# Patient Record
(demographics unavailable — no encounter records)

---

## 2025-04-22 NOTE — PHYSICAL EXAM
[General Appearance - Well Developed] : well developed [General Appearance - Well Nourished] : well nourished [Normal Appearance] : normal appearance [Well Groomed] : well groomed [General Appearance - In No Acute Distress] : no acute distress [] : no respiratory distress [Exaggerated Use Of Accessory Muscles For Inspiration] : no accessory muscle use [Costovertebral Angle Tenderness] : no ~M costovertebral angle tenderness [Normal Station and Gait] : the gait and station were normal for the patient's age [Skin Color & Pigmentation] : normal skin color and pigmentation [No Focal Deficits] : no focal deficits [Oriented To Time, Place, And Person] : oriented to person, place, and time [Affect] : the affect was normal [Mood] : the mood was normal [Not Anxious] : not anxious

## 2025-04-24 NOTE — LETTER BODY
[Dear  ___] : Dear  [unfilled], [Courtesy Letter:] : I had the pleasure of seeing your patient, [unfilled], in my office today. [Please see my note below.] : Please see my note below. [Consult Closing:] : Thank you very much for allowing me to participate in the care of this patient.  If you have any questions, please do not hesitate to contact me. [Sincerely,] : Sincerely, [FreeTextEntry3] : Fabricio Parikh MD System Director Urogynecology/URPS Department of Urology [facility]   at [facility]  of Urology [facility] at [facility]

## 2025-04-24 NOTE — LETTER BODY
[Dear  ___] : Dear  [unfilled], [Courtesy Letter:] : I had the pleasure of seeing your patient, [unfilled], in my office today. [Please see my note below.] : Please see my note below. [Consult Closing:] : Thank you very much for allowing me to participate in the care of this patient.  If you have any questions, please do not hesitate to contact me. [Sincerely,] : Sincerely, [FreeTextEntry3] : Fabricio Parikh MD System Director Urogynecology/URPS Department of Urology [facility]   at [facility]  of Urology [facility] at [facility] [place]

## 2025-04-24 NOTE — ASSESSMENT
[FreeTextEntry1] :   Impression/plan: 81-year-old female with gross hematuria.   1. Gross hematuria workup explained, reviewed possible sources of hematuria, questions answered.  2. Urine culture and cytology. Will f/u with result.  3. CT urogram 4. F/u for cystoscopy 5. Call office if symptoms worsen prior to next visit.   I, Dr. Fabricio Parikh, personally performed the evaluation and management (E/M) services for this new patient.  That E/M includes conducting the clinically appropriate initial history &/or exam, assessing all conditions, and establishing the plan of care.  Today, my JERILYN Tammy, was here to observe &/or participate in the visit & follow plan of care established by me.

## 2025-04-24 NOTE — HISTORY OF PRESENT ILLNESS
[FreeTextEntry1] : 81-year-old female here for evaluation of hematuria. She first noticed pink urine on 4/15/25. Hematuria increased on 4/17/25. She went to urgent care and was referred to the ED. She was evaluated at [facility]. Urinalysis showed 50 RBCs/hpf. Urine culture was not done. She did not have any imaging. Advised to follow up with Urology. Hematuria has since resolved but she reports pain to her left hip yesterday. Took 1 tab Advil for pain relief. She denies history of stones, chemical exposure or trauma. No family hx of  malignancies. She had 1 UTI several years ago. Former 4 cigarette per day smoker x 5 years, quit 10 years ago. Denies fever, chills, dysuria or frequency.  Urinalysis  RBCs>50/hpf  PMH-asthma(seasonal), high cholesterol, osteoporosis  PSH-denies FamHx-Breast CA(mother) SocHx-former smoker, denies drug or alcohol use Meds/Vitamins-Calcium, Vitamin D, Alendronate, atorvastatin, albuterol Allergies-NKDA

## 2025-04-24 NOTE — HISTORY OF PRESENT ILLNESS
[FreeTextEntry1] : 81-year-old female here for evaluation of hematuria. She first noticed pink urine on 4/15/25. Hematuria increased on 4/17/25. She went to urgent care and was referred to the ED. She was evaluated at [facility]. Urinalysis showed 50 RBCs/hpf. Urine culture was not done. She did not have any imaging. Advised to follow up with Urology. Hematuria has since resolved but she reports pain to her left hip yesterday. Took 1 tab Advil for pain relief. She denies history of stones, chemical exposure or trauma. No family hx of  malignancies. She had 1 UTI several years ago. Former 4 cigarette per day smoker x 5 years, quit 10 years ago. Denies fever, chills, dysuria or frequency.  Urinalysis  RBCs>50/hpf  PMH-asthma(seasonal), high cholesterol, osteoporosis  PSH-denies FamHx-Breast CA(mother) SocHx-former smoker, denies drug or alcohol use Meds/Vitamins-Calcium, Vitamin D, Alendronate, atorvastatin, albuterol Allergies-NKDA

## 2025-05-14 NOTE — HISTORY OF PRESENT ILLNESS
[FreeTextEntry1] : 81-year-old female here for evaluation of hematuria. She first noticed pink urine on 4/15/25. Hematuria increased on 4/17/25. She went to urgent care and was referred to the ED. She was evaluated at [facility]. Urinalysis showed 50 RBCs/hpf. Urine culture was not done. She did not have any imaging. Advised to follow up with Urology. Hematuria has since resolved but she reports pain to her left hip yesterday. Took 1 tab Advil for pain relief. She denies history of stones, chemical exposure or trauma. No family hx of  malignancies. She had 1 UTI several years ago. Former 4 cigarette per day smoker x 5 years, quit 10 years ago. Denies fever, chills, dysuria or frequency.  Urinalysis RBCs>50/hpf  PMH-asthma(seasonal), high cholesterol, osteoporosis PSH-denies FamHx-Breast CA(mother) SocHx-former smoker, denies drug or alcohol use Meds/Vitamins-Calcium, Vitamin D, Alendronate, atorvastatin, albuterol Allergies-NKDA  5/14/25  81-year-old female seen previously for evaluation of gross hematuria, found to have ureteral stones.  She had had a cystoscopy approximately 2 weeks ago and found a small stone that was removed from her bladder that appeared to have passed.  She is here today because over the last couple days she has had recurrence of some dysuria and some bladder discomfort.  No other episodes of hematuria.  She denies fever, chills, nausea, vomiting.  Repeat CT scan reviewed reveals persistent 5 mm UVJ stone and smaller 3 mm ureteral stone with some mild hydroureteronephrosis.  Plan: Given nonpassage of the stone and persistence of symptoms, I recommended her to see my partner Dr. Melgar who will see her tomorrow for definitive treatment for her stone.  Please note that the pathology from the previous stone was calcium phosphate.

## 2025-05-14 NOTE — LETTER BODY
[Dear  ___] : Dear  [unfilled], [Courtesy Letter:] : I had the pleasure of seeing your patient, [unfilled], in my office today. [Please see my note below.] : Please see my note below. [Consult Closing:] : Thank you very much for allowing me to participate in the care of this patient.  If you have any questions, please do not hesitate to contact me. [Sincerely,] : Sincerely, [FreeTextEntry3] : Fabricio Parikh MD System Director Urogynecology/URPS Department of Urology [facility]   at [facility]  of Urology [facility] at [facility]

## 2025-05-14 NOTE — LETTER BODY
[Dear  ___] : Dear  [unfilled], [Courtesy Letter:] : I had the pleasure of seeing your patient, [unfilled], in my office today. [Please see my note below.] : Please see my note below. [Consult Closing:] : Thank you very much for allowing me to participate in the care of this patient.  If you have any questions, please do not hesitate to contact me. [Sincerely,] : Sincerely, [FreeTextEntry3] : Fabricio Parikh MD System Director Urogynecology/URPS Department of Urology [facility]   at [facility]  of Urology [facility] at [facility]

## 2025-05-15 NOTE — PHYSICAL EXAM
[Normal Appearance] : normal appearance [General Appearance - In No Acute Distress] : no acute distress [Edema] : no peripheral edema [] : no respiratory distress [Abdomen Soft] : soft [Normal Station and Gait] : the gait and station were normal for the patient's age [Skin Color & Pigmentation] : normal skin color and pigmentation [No Focal Deficits] : no focal deficits [Oriented To Time, Place, And Person] : oriented to person, place, and time [Not Anxious] : not anxious

## 2025-05-15 NOTE — ASSESSMENT
[FreeTextEntry1] : 82 yo female with left distal stone.  She passed the 5 mm left UVJ stone yesterday.  I spoke to the reading radiologist at [facility] regarding whether there were one or two stone in the ureter seen on 5/12/25.  The 3 mm left distal calcification may be just adjacent to the left distal ureter and not within the ureteral lumen.  She currently feels pain free and therefore it is likely that she passed the only ureteral stone.  She will RTC in1 month for follow up after she returns from her trip to [place].  She will notify me if any symptoms of renal colic return.

## 2025-05-15 NOTE — HISTORY OF PRESENT ILLNESS
[FreeTextEntry1] : 80 yo female with recent diagnosis of left ureteral stones which was identified on CTU as part of gross hematuria evaluation.  She had 2 CT scans.  The first CT scan was on April 28th which identified a left distal ureteral stone.  She then underwent another CT scan on May 12th which identified 2 left distal stones, 5 mm left UVJ stone and a 3 mm left distal stone.  She passed the 5 mm stone yesterday and brought it in today to her appointment.  She feels well and denies any flank pain, fevers, chills, nausea, vomiting, hematuria, or irritative voiding symptoms.